# Patient Record
(demographics unavailable — no encounter records)

---

## 2024-12-11 NOTE — DISCUSSION/SUMMARY
[FreeTextEntry1] : ---Assessment plan----------The patient has been referred here for further opinion regarding pulmonary problem,   Thanks for allowing  me to participate  in the care of this patient.  Patient at this time  will follow  the above mentioned recommendations and return back for follow up visit. If you have any questions  I can be reached  at # [phone] (office). Thierno Duncan MD, FCCP  Pulmonary, Critical Care and Sleep Medicine

## 2024-12-11 NOTE — DISCUSSION/SUMMARY
[FreeTextEntry1] : ---Assessment plan----------The patient has been referred here for further opinion regarding pulmonary problem,   Thanks for allowing  me to participate  in the care of this patient.  Patient at this time  will follow  the above mentioned recommendations and return back for follow up visit. If you have any questions  I can be reached  at # [phone] (office). Thierno Duncan MD, FCCP  Pulmonary, Critical Care and Sleep Medicine

## 2024-12-11 NOTE — DISCUSSION/SUMMARY
[FreeTextEntry1] : ---Assessment plan----------The patient has been referred here for further opinion regarding pulmonary problem,   Thanks for allowing  me to participate  in the care of this patient.  Patient at this time  will follow  the above mentioned recommendations and return back for follow up visit. If you have any questions  I can be reached  at # [phone] (office). Thierno Duncan MD, FCCP  Pulmonary, Critical Care and Sleep Medicine

## 2024-12-11 NOTE — HISTORY OF PRESENT ILLNESS
[Never] : never [TextBox_4] : This letter  is regarding your patient  who  attended pulmonary out patient office today.  I have reviewed  patient's  past history, social history, family history and medication list. I also  reviewed nurse practitioners/ and fellows  notes and assessment and agree with it.   The patient was referred by   ------No history of , fever, chills , rigors, chest pain, or hemoptysis. Questionable history of Raynaud's phenomenon. No h/o significant weight loss in last few months. No history of liver dysfunction , collagen vascular disorder or chronic thromboembolic disease. I would classify the patient's dyspnea as WHO  FUNCTIONAL CLASS II--------  ----Echo  date------ ----Pft date--------- ----Ct scan date------- ----Cath date------------

## 2024-12-12 NOTE — REVIEW OF SYSTEMS
Percent Granulation (Optional): 100% Percent Reepithelialization (Optional): 40% Follow Up Units (Optional): 0 Wound Color?: pink Wound Crusting?: clean Detail Level: Detailed Wound Evaluated By (Optional): Dr. Gucci Singh [SOB on Exertion] : sob on exertion [Negative] : Endocrine

## 2025-03-27 NOTE — DISCUSSION/SUMMARY
[FreeTextEntry1] : ---Assessment plan----------The patient has been referred here for further opinion regarding pulmonary problem,  64 yo w/asthma and ? abn CXR 1) continue w/ trelegy, singulair, albuterol prednisone 15mg x 1 week, then 10mg x 2 weeks start azithromycin M, W, F consider adding tezspire- drug ed given to pt-await ins approval 2) solumedrol 20mg IM in office today 3) flonase for PND 4) Need full PFTs w/ next appt 6) GERD- on PPI 7) RTO in 4-6 weeks w/ PFTs  Thanks for allowing  me to participate  in the care of this patient.  Patient at this time  will follow  the above mentioned recommendations and return back for follow up visit. If you have any questions  I can be reached  at # [phone] (office). Thierno Duncan MD, FCCP  Pulmonary, Critical Care and Sleep Medicine

## 2025-03-27 NOTE — DISCUSSION/SUMMARY
[FreeTextEntry1] : ---Assessment plan----------The patient has been referred here for further opinion regarding pulmonary problem,  62 yo w/asthma and ? abn CXR 1) continue w/ trelegy, singulair, albuterol prednisone 15mg x 1 week, then 10mg x 2 weeks start azithromycin M, W, F consider adding tezspire- drug ed given to pt-await ins approval 2) solumedrol 20mg IM in office today 3) flonase for PND 4) Need full PFTs w/ next appt 6) GERD- on PPI 7) RTO in 4-6 weeks w/ PFTs  Thanks for allowing  me to participate  in the care of this patient.  Patient at this time  will follow  the above mentioned recommendations and return back for follow up visit. If you have any questions  I can be reached  at # [phone] (office). Thierno Duncan MD, FCCP  Pulmonary, Critical Care and Sleep Medicine

## 2025-03-27 NOTE — HISTORY OF PRESENT ILLNESS
[Never] : never [TextBox_4] : This letter  is regarding your patient  who  attended pulmonary out patient office today.  I have reviewed  patient's  past history, social history, family history and medication list. I also  reviewed nurse practitioners/ and fellows  notes and assessment and agree with it.   The patient was referred by self  64 yo w/PMH asthma- treated w/albuterol neb and inhaler, arnuity, singulair  Was told by previous pulm that she has ? scarring in upper lobes on CXR (report unavailable) Uses flonase for PND and pepcid for GERD PMH HTN  reports that her shortness of breath/cough have been worse over last one year- triggers include exposure to perfumes, heat, wind, starir climbing, talking  She is a lifelong nonsmoker and former CNA She has no pets w/in her home  03/2025 Asthma -- taking trelegy, singulair and albuterol PRN Returned from [place] last month -- continues to c/o SOB and cough, s/p treatment with zpack, augmentin and prednisone CT Chest from Dec 2024 - unremarkable

## 2025-03-27 NOTE — DISCUSSION/SUMMARY
[FreeTextEntry1] : ---Assessment plan----------The patient has been referred here for further opinion regarding pulmonary problem,  62 yo w/asthma and ? abn CXR 1) continue w/ trelegy, singulair, albuterol prednisone 15mg x 1 week, then 10mg x 2 weeks start azithromycin M, W, F consider adding tezspire- drug ed given to pt-await ins approval 2) solumedrol 20mg IM in office today 3) flonase for PND 4) Need full PFTs w/ next appt 6) GERD- on PPI 7) RTO in 4-6 weeks w/ PFTs  Thanks for allowing  me to participate  in the care of this patient.  Patient at this time  will follow  the above mentioned recommendations and return back for follow up visit. If you have any questions  I can be reached  at # [phone] (office). Thierno Duncan MD, FCCP  Pulmonary, Critical Care and Sleep Medicine

## 2025-03-27 NOTE — END OF VISIT
[FreeTextEntry3] :   I, Dr. Thierno Duncan  personally performed the evaluation and management (E/M) services for this established patient who presents today with (a) new problem(s)/exacerbation of (an) existing condition(s). That E/M includes conducting the clinically appropriate interval history &/or exam, assessing all new/exacerbated conditions, and establishing a new plan of care. Today, my ERMELINDA, Kalina Alford NP, , was here to observe my evaluation and management service for this new problem/exacerbated condition and follow the plan of care established by me going forward. [Time Spent: ___ minutes] : I have spent [unfilled] minutes of time on the encounter which excludes teaching and separately reported services.

## 2025-05-29 NOTE — DISCUSSION/SUMMARY
[FreeTextEntry1] : ---Assessment plan----------The patient has been referred here for further opinion regarding pulmonary problem,  63 yo w/asthma ------NON  Th2 profile 1) continue w/ trelegy, singulair, albuterol 2) c/w tezspire- will continue to monitor symptoms and side effects  3) flonase for PND and add azelastine -will get CT sinus ? polyps -referred to ENT 4)  GERD- on PPI 5) attempted FeNO but pt unable to complete testing 6) f/u in 9/2025 7) labs drawn in office today  Thanks for allowing  me to participate  in the care of this patient.  Patient at this time  will follow  the above mentioned recommendations and return back for follow up visit. If you have any questions  I can be reached  at # [phone] (office). Thierno Duncan MD, FCCP  Pulmonary, Critical Care and Sleep Medicine

## 2025-05-29 NOTE — PHYSICAL EXAM
[No Acute Distress] : no acute distress [Normal Oropharynx] : normal oropharynx [Normal Appearance] : normal appearance [No Neck Mass] : no neck mass [Normal Rate/Rhythm] : normal rate/rhythm [Normal S1, S2] : normal s1, s2 [No Murmurs] : no murmurs [No Resp Distress] : no resp distress [Clear to Auscultation Bilaterally] : clear to auscultation bilaterally [No Abnormalities] : no abnormalities [Benign] : benign [Normal Gait] : normal gait [No Clubbing] : no clubbing [No Cyanosis] : no cyanosis [No Edema] : no edema [FROM] : FROM [Normal Color/ Pigmentation] : normal color/ pigmentation [No Focal Deficits] : no focal deficits [Oriented x3] : oriented x3 [Normal Affect] : normal affect [TextBox_11] : nasal congestion

## 2025-05-29 NOTE — HISTORY OF PRESENT ILLNESS
[Never] : never [TextBox_4] : This letter  is regarding your patient  who  attended pulmonary out patient office today.  I have reviewed  patient's  past history, social history, family history and medication list. I also  reviewed nurse practitioners/ and fellows  notes and assessment and agree with it.   The patient was referred by self  65 yo w/PMH asthma- treated w/albuterol neb and inhaler, arnuity, singulair  Was told by previous pulm that she has ? 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

## 2025-05-29 NOTE — HISTORY OF PRESENT ILLNESS
[Never] : never [TextBox_4] : This letter  is regarding your patient  who  attended pulmonary out patient office today.  I have reviewed  patient's  past history, social history, family history and medication list. I also  reviewed nurse practitioners/ and fellows  notes and assessment and agree with it.   The patient was referred by self  65 yo w/PMH asthma- treated w/albuterol neb and inhaler, arnuity, singulair  Was told by previous pulm that she has ? 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

## 2025-05-29 NOTE — DISCUSSION/SUMMARY
[FreeTextEntry1] : ---Assessment plan----------The patient has been referred here for further opinion regarding pulmonary problem,  63 yo w/asthma ------NON  Th2 profile 1) continue w/ trelegy, singulair, albuterol 2) c/w tezspire- will continue to monitor symptoms and side effects  3) flonase for PND and add azelastine -will get CT sinus ? polyps -referred to ENT 4)  GERD- on PPI 5) attempted FeNO but pt unable to complete testing 6) f/u in 9/2025 7) labs drawn in office today  Thanks for allowing  me to participate  in the care of this patient.  Patient at this time  will follow  the above mentioned recommendations and return back for follow up visit. If you have any questions  I can be reached  at # [phone] (office). Thierno Duncan MD, FCCP  Pulmonary, Critical Care and Sleep Medicine